# Patient Record
Sex: MALE | Race: WHITE | NOT HISPANIC OR LATINO | URBAN - METROPOLITAN AREA
[De-identification: names, ages, dates, MRNs, and addresses within clinical notes are randomized per-mention and may not be internally consistent; named-entity substitution may affect disease eponyms.]

---

## 2018-03-23 ENCOUNTER — EMERGENCY (EMERGENCY)
Facility: HOSPITAL | Age: 43
LOS: 1 days | Discharge: HOME | End: 2018-03-23
Attending: EMERGENCY MEDICINE | Admitting: EMERGENCY MEDICINE
Payer: COMMERCIAL

## 2018-03-23 VITALS
DIASTOLIC BLOOD PRESSURE: 91 MMHG | OXYGEN SATURATION: 100 % | HEART RATE: 64 BPM | RESPIRATION RATE: 18 BRPM | TEMPERATURE: 98 F | SYSTOLIC BLOOD PRESSURE: 143 MMHG
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
## 2018-03-23 DIAGNOSIS — M25.512 PAIN IN LEFT SHOULDER: ICD-10-CM

## 2018-03-23 DIAGNOSIS — R11.0 NAUSEA: ICD-10-CM
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
## 2018-03-23 DIAGNOSIS — R07.81 PLEURODYNIA: ICD-10-CM

## 2018-03-23 PROCEDURE — 73030 X-RAY EXAM OF SHOULDER: CPT | Mod: 26,LT

## 2018-03-23 PROCEDURE — 71250 CT THORAX DX C-: CPT | Mod: 26

## 2018-03-23 PROCEDURE — 71101 X-RAY EXAM UNILAT RIBS/CHEST: CPT | Mod: 26,LT

## 2018-03-23 PROCEDURE — 99284 EMERGENCY DEPT VISIT MOD MDM: CPT | Mod: 25

## 2018-03-23 PROCEDURE — 73030 X-RAY EXAM OF SHOULDER: CPT | Mod: 26,LT,RT

## 2018-03-23 PROCEDURE — 71100 X-RAY EXAM RIBS UNI 2 VIEWS: CPT | Mod: 26,LT

## 2018-03-23 RX ORDER — TRAMADOL HYDROCHLORIDE 50 MG/1
50 TABLET ORAL ONCE
Qty: 0 | Refills: 0 | Status: DISCONTINUED | OUTPATIENT
Start: 2018-03-23 | End: 2018-03-23

## 2018-03-23 RX ORDER — OXYCODONE AND ACETAMINOPHEN 5; 325 MG/1; MG/1
1 TABLET ORAL ONCE
Qty: 0 | Refills: 0 | Status: DISCONTINUED | OUTPATIENT
Start: 2018-03-23 | End: 2018-03-23

## 2018-03-23 RX ORDER — TRAMADOL HYDROCHLORIDE 50 MG/1
1 TABLET ORAL
Qty: 16 | Refills: 0 | OUTPATIENT
Start: 2018-03-23 | End: 2018-03-26

## 2018-03-23 RX ADMIN — OXYCODONE AND ACETAMINOPHEN 1 TABLET(S): 5; 325 TABLET ORAL at 17:15

## 2018-03-23 RX ADMIN — TRAMADOL HYDROCHLORIDE 50 MILLIGRAM(S): 50 TABLET ORAL at 18:35
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
## 2018-03-23 NOTE — ED PROVIDER NOTE - PHYSICAL EXAMINATION
MSK: L posterior and lateral shoulder area with tenderness to palpation.  Unable to range shoulder due to pain.  No pain with elbow flexion/extension/supination/pronation.  No pain with wrist flexion and extension.  Normal distal motor and sensory of the median, ulnar and radial nerves.  Mild TTP L anterior rib.  No ecchymosis or crepitus.
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
## 2018-03-23 NOTE — ED PROVIDER NOTE - PROGRESS NOTE DETAILS
x-rays unremarkable so CT scan was performed due to the mechanism.  No fractures seen on imaging.  Pain improved.  Conservative management discussed with the patient in detail.  Close PMD follow up encouraged.  Strict ED return instructions discussed in detail and patient given the opportunity to ask any questions about their discharge diagnosis and instructions spoke with Dr. Ascencio----? AC separation (Grade 1-2) with mild tenderness on exam.  Sling, ortho follow up.  D/w patient spoke with Dr. Ascencio----? AC separation (Grade 1-2) with mild tenderness on exam.  Patient can not tolerate weight bearing films at this time.  Sling, ortho follow up.  D/w patient.  Lives in in [place] and prefers to see orthopedist in his area
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
## 2018-03-23 NOTE — ED PROVIDER NOTE - DIAGNOSTIC INTERPRETATION
ER Physician: Anam Renee  INTERPRETATION:  no acute fracture; no soft tissue swelling noted; normal bony alignment.  ER Physician: Anam Renee  INTERPRETATION:  no acute fracture; no soft tissue swelling noted; normal bony alignment.
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
